# Patient Record
(demographics unavailable — no encounter records)

---

## 2025-07-09 NOTE — PLAN
[FreeTextEntry1] : Pap smear completed discussed birth control options continue multivitamin vitamin D3 patient needs mammogram and bilateral breast sonogram return here in 12 months

## 2025-07-09 NOTE — HISTORY OF PRESENT ILLNESS
[Y] : Patient is sexually active [N] : Patient denies prior pregnancies [Currently Active] : currently active [Men] : men [Vaginal] : vaginal [No] : No [Yes] : Yes [TextBox_4] : patient is here for annual examination patient states that she had 2 times her period on May after she took 'Plan B' lmp 06/27/2025 [Mammogramdate] : 11/1/2023 [TextBox_19] : br1 [BreastSonogramDate] : 11/1/2023 [TextBox_25] : br1 [PapSmeardate] : 08/2/2023 [TextBox_31] : wnl [LMPDate] : 6/27/25 [MensesFreq] : 28 [MensesLength] : 5 [TextBox_6] : 6/27/25 [TextBox_9] : 13 [TextBox_13] : 28 [TextBox_15] : 5 [FreeTextEntry1] : 6/27/25

## 2025-07-09 NOTE — PHYSICAL EXAM
[Chaperoned Physical Exam] : A chaperone was present in the examining room during all aspects of the physical examination. [MA] : MA [Appropriately responsive] : appropriately responsive [Alert] : alert [No Acute Distress] : no acute distress [No Lymphadenopathy] : no lymphadenopathy [Regular Rate Rhythm] : regular rate rhythm [No Murmurs] : no murmurs [Clear to Auscultation B/L] : clear to auscultation bilaterally [Soft] : soft [Non-tender] : non-tender [Non-distended] : non-distended [No HSM] : No HSM [No Lesions] : no lesions [No Mass] : no mass [Oriented x3] : oriented x3 [Examination Of The Breasts] : a normal appearance [No Masses] : no breast masses were palpable [Labia Majora] : normal [Labia Minora] : normal [No Bleeding] : There was no active vaginal bleeding [Normal] : normal [Anteversion] : anteverted [Uterine Adnexae] : normal [Declined] : Patient declined rectal exam [FreeTextEntry2] : kenyetta [FreeTextEntry6] : No masses nontender bilateral symmetrical [FreeTextEntry8] : No masses nontender